# Patient Record
Sex: MALE | Race: WHITE
[De-identification: names, ages, dates, MRNs, and addresses within clinical notes are randomized per-mention and may not be internally consistent; named-entity substitution may affect disease eponyms.]

---

## 2019-12-22 ENCOUNTER — HOSPITAL ENCOUNTER (OUTPATIENT)
Dept: HOSPITAL 53 - M ADAMS | Age: 34
End: 2019-12-22
Attending: PHYSICIAN ASSISTANT
Payer: COMMERCIAL

## 2019-12-22 DIAGNOSIS — R50.9: ICD-10-CM

## 2019-12-22 DIAGNOSIS — R05: Primary | ICD-10-CM

## 2019-12-22 NOTE — REP
REASON: Cough and fever.

 

PRIORS: None.

 

FINDINGS:

 

The superior mediastinal structures are midline.  The cardiac silhouette is

unremarkable in size, shape, and position.  The diaphragmatic surfaces of the

lungs are regular, and the costophrenic angles are clear.  The pulmonary fields

are clear.  The imaged osseous structures are intact.

 

IMPRESSION:

 

There is no acute cardiopulmonary disease.

 

 

Electronically Signed by

Chad Campos DO 12/22/2019 02:01 P

## 2024-12-03 ENCOUNTER — HOSPITAL ENCOUNTER (EMERGENCY)
Dept: HOSPITAL 53 - M ED | Age: 39
Discharge: HOME | End: 2024-12-03
Payer: OTHER GOVERNMENT

## 2024-12-03 VITALS — HEIGHT: 72 IN | BODY MASS INDEX: 33.8 KG/M2 | WEIGHT: 249.56 LBS

## 2024-12-03 VITALS — TEMPERATURE: 98.3 F | OXYGEN SATURATION: 99 % | DIASTOLIC BLOOD PRESSURE: 78 MMHG | SYSTOLIC BLOOD PRESSURE: 137 MMHG
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
## 2024-12-03 DIAGNOSIS — S01.419A: Primary | ICD-10-CM
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
## 2024-12-03 DIAGNOSIS — Z23: ICD-10-CM

## 2024-12-03 DIAGNOSIS — Z91.048: ICD-10-CM
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
## 2024-12-03 RX ADMIN — TETANUS TOXOID, REDUCED DIPHTHERIA TOXOID AND ACELLULAR PERTUSSIS VACCINE, ADSORBED ONE ML: 5; 2.5; 8; 8; 2.5 SUSPENSION INTRAMUSCULAR at 19:50

## 2024-12-03 RX ADMIN — LIDOCAINE HYDROCHLORIDE ONE ML: 10 INJECTION, SOLUTION INFILTRATION; PERINEURAL at 18:45
